# Patient Record
Sex: FEMALE | Race: WHITE | NOT HISPANIC OR LATINO
[De-identification: names, ages, dates, MRNs, and addresses within clinical notes are randomized per-mention and may not be internally consistent; named-entity substitution may affect disease eponyms.]

---

## 2018-01-19 ENCOUNTER — TRANSCRIPTION ENCOUNTER (OUTPATIENT)
Age: 38
End: 2018-01-19

## 2018-09-28 ENCOUNTER — TRANSCRIPTION ENCOUNTER (OUTPATIENT)
Age: 38
End: 2018-09-28

## 2019-01-29 ENCOUNTER — OUTPATIENT (OUTPATIENT)
Dept: OUTPATIENT SERVICES | Facility: HOSPITAL | Age: 39
LOS: 1 days | End: 2019-01-29
Payer: COMMERCIAL

## 2019-01-29 VITALS
RESPIRATION RATE: 16 BRPM | HEART RATE: 94 BPM | OXYGEN SATURATION: 98 % | WEIGHT: 201.06 LBS | SYSTOLIC BLOOD PRESSURE: 135 MMHG | TEMPERATURE: 99 F | DIASTOLIC BLOOD PRESSURE: 93 MMHG

## 2019-01-29 DIAGNOSIS — N84.0 POLYP OF CORPUS UTERI: ICD-10-CM

## 2019-01-29 DIAGNOSIS — Z90.89 ACQUIRED ABSENCE OF OTHER ORGANS: Chronic | ICD-10-CM

## 2019-01-29 DIAGNOSIS — Z01.818 ENCOUNTER FOR OTHER PREPROCEDURAL EXAMINATION: ICD-10-CM

## 2019-01-29 LAB
ALBUMIN SERPL ELPH-MCNC: 3.1 G/DL — LOW (ref 3.3–5)
ALP SERPL-CCNC: 49 U/L — SIGNIFICANT CHANGE UP (ref 40–120)
ALT FLD-CCNC: 16 U/L — SIGNIFICANT CHANGE UP (ref 12–78)
ANION GAP SERPL CALC-SCNC: 7 MMOL/L — SIGNIFICANT CHANGE UP (ref 5–17)
AST SERPL-CCNC: 17 U/L — SIGNIFICANT CHANGE UP (ref 15–37)
BILIRUB SERPL-MCNC: 0.4 MG/DL — SIGNIFICANT CHANGE UP (ref 0.2–1.2)
BUN SERPL-MCNC: 10 MG/DL — SIGNIFICANT CHANGE UP (ref 7–23)
CALCIUM SERPL-MCNC: 8.7 MG/DL — SIGNIFICANT CHANGE UP (ref 8.5–10.1)
CHLORIDE SERPL-SCNC: 108 MMOL/L — SIGNIFICANT CHANGE UP (ref 96–108)
CO2 SERPL-SCNC: 26 MMOL/L — SIGNIFICANT CHANGE UP (ref 22–31)
CREAT SERPL-MCNC: 0.51 MG/DL — SIGNIFICANT CHANGE UP (ref 0.5–1.3)
GLUCOSE SERPL-MCNC: 87 MG/DL — SIGNIFICANT CHANGE UP (ref 70–99)
HCG SERPL-ACNC: <1 MIU/ML — SIGNIFICANT CHANGE UP
HCT VFR BLD CALC: 38.3 % — SIGNIFICANT CHANGE UP (ref 34.5–45)
HGB BLD-MCNC: 12.2 G/DL — SIGNIFICANT CHANGE UP (ref 11.5–15.5)
MCHC RBC-ENTMCNC: 28 PG — SIGNIFICANT CHANGE UP (ref 27–34)
MCHC RBC-ENTMCNC: 31.9 GM/DL — LOW (ref 32–36)
MCV RBC AUTO: 88 FL — SIGNIFICANT CHANGE UP (ref 80–100)
NRBC # BLD: 0 /100 WBCS — SIGNIFICANT CHANGE UP (ref 0–0)
PLATELET # BLD AUTO: 336 K/UL — SIGNIFICANT CHANGE UP (ref 150–400)
POTASSIUM SERPL-MCNC: 4.3 MMOL/L — SIGNIFICANT CHANGE UP (ref 3.5–5.3)
POTASSIUM SERPL-SCNC: 4.3 MMOL/L — SIGNIFICANT CHANGE UP (ref 3.5–5.3)
PROT SERPL-MCNC: 7 G/DL — SIGNIFICANT CHANGE UP (ref 6–8.3)
RBC # BLD: 4.35 M/UL — SIGNIFICANT CHANGE UP (ref 3.8–5.2)
RBC # FLD: 13.9 % — SIGNIFICANT CHANGE UP (ref 10.3–14.5)
SODIUM SERPL-SCNC: 141 MMOL/L — SIGNIFICANT CHANGE UP (ref 135–145)
WBC # BLD: 6.26 K/UL — SIGNIFICANT CHANGE UP (ref 3.8–10.5)
WBC # FLD AUTO: 6.26 K/UL — SIGNIFICANT CHANGE UP (ref 3.8–10.5)

## 2019-01-29 PROCEDURE — 80053 COMPREHEN METABOLIC PANEL: CPT

## 2019-01-29 PROCEDURE — 36415 COLL VENOUS BLD VENIPUNCTURE: CPT

## 2019-01-29 PROCEDURE — 85027 COMPLETE CBC AUTOMATED: CPT

## 2019-01-29 PROCEDURE — 86901 BLOOD TYPING SEROLOGIC RH(D): CPT

## 2019-01-29 PROCEDURE — 86850 RBC ANTIBODY SCREEN: CPT

## 2019-01-29 PROCEDURE — G0463: CPT

## 2019-01-29 PROCEDURE — 86900 BLOOD TYPING SEROLOGIC ABO: CPT

## 2019-01-29 PROCEDURE — 84702 CHORIONIC GONADOTROPIN TEST: CPT

## 2019-01-29 RX ORDER — NORGESTIMATE AND ETHINYL ESTRADIOL 7DAYSX3 LO
0 KIT ORAL
Qty: 0 | Refills: 0 | COMMUNITY

## 2019-01-29 RX ORDER — APREMILAST 10-20-30MG
0 KIT ORAL
Qty: 180 | Refills: 0 | COMMUNITY

## 2019-01-29 RX ORDER — CLOTRIMAZOLE AND BETAMETHASONE DIPROPIONATE 10; .5 MG/G; MG/G
0 CREAM TOPICAL
Qty: 90 | Refills: 0 | COMMUNITY

## 2019-01-29 RX ORDER — TERIFLUNOMIDE 7 MG/1
0 TABLET, FILM COATED ORAL
Qty: 28 | Refills: 0 | COMMUNITY

## 2019-01-29 RX ORDER — NORGESTIMATE AND ETHINYL ESTRADIOL 7DAYSX3 LO
0 KIT ORAL
Qty: 28 | Refills: 0 | COMMUNITY

## 2019-01-29 NOTE — H&P PST ADULT - SKIN/BREAST COMMENTS
Psoriasis under breast and groin folds Pt with psoriasis under breast and groin folds using steroid cream as needed

## 2019-01-29 NOTE — H&P PST ADULT - HISTORY OF PRESENT ILLNESS
37yo female with PMH of MS here for PST. Pt complaining of vaginal bleeding in between menses for 3 months at end of 2018. Pt s/p sonogram and was told "there are 2 uterine polyps". Pt denies fibroids, cysts and uterine lining thickening. Pt denies n/v/d, abdominal pain and pelvic pain. Pt electing for dilation and curettage hysteroscopy, polypectomy on 2/8/19.

## 2019-01-29 NOTE — H&P PST ADULT - NSANTHOSAYNRD_GEN_A_CORE
No. GUSTABO screening performed.  STOP BANG Legend: 0-2 = LOW Risk; 3-4 = INTERMEDIATE Risk; 5-8 = HIGH Risk

## 2019-01-29 NOTE — H&P PST ADULT - NEGATIVE PSYCHIATRIC SYMPTOMS
Pt denies/no depression/no anxiety Pt denies but had h/x of depression as a teenager/no depression/no anxiety

## 2019-01-29 NOTE — H&P PST ADULT - PROBLEM SELECTOR PLAN 2
Medical clearance needed as per surgeon. CBC, Comprehensive panel, T&S and HCG ordered. Pre-op instructions given and pt verbalized understanding.

## 2019-01-29 NOTE — H&P PST ADULT - RS GEN PE MLT RESP DETAILS PC
airway patent/clear to auscultation bilaterally/good air movement/normal/respirations non-labored/breath sounds equal

## 2019-01-29 NOTE — H&P PST ADULT - FAMILY HISTORY
Mother  Still living? Yes, Estimated age: Age Unknown  Family history of breast cancer in mother, Age at diagnosis: Age Unknown  MI (myocardial infarction), Age at diagnosis: Age Unknown

## 2019-01-29 NOTE — H&P PST ADULT - PMH
MS (multiple sclerosis)  (Dx in 2017, currently asymptomatic)  Psoriatic arthritis    Seasonal allergies MS (multiple sclerosis)  (Dx in 2017, currently asymptomatic)  Polyp of corpus uteri    Psoriatic arthritis    Seasonal allergies

## 2019-02-10 PROBLEM — J30.2 OTHER SEASONAL ALLERGIC RHINITIS: Chronic | Status: ACTIVE | Noted: 2019-01-29

## 2019-02-10 PROBLEM — L40.50 ARTHROPATHIC PSORIASIS, UNSPECIFIED: Chronic | Status: ACTIVE | Noted: 2019-01-29

## 2019-02-10 PROBLEM — N84.0 POLYP OF CORPUS UTERI: Chronic | Status: ACTIVE | Noted: 2019-01-29

## 2019-02-10 PROBLEM — G35 MULTIPLE SCLEROSIS: Chronic | Status: ACTIVE | Noted: 2019-01-29

## 2019-02-21 ENCOUNTER — TRANSCRIPTION ENCOUNTER (OUTPATIENT)
Age: 39
End: 2019-02-21

## 2019-02-21 RX ORDER — ACETAMINOPHEN 500 MG
650 TABLET ORAL ONCE
Qty: 0 | Refills: 0 | Status: COMPLETED | OUTPATIENT
Start: 2019-02-22 | End: 2019-02-22

## 2019-02-21 RX ORDER — SODIUM CHLORIDE 9 MG/ML
1000 INJECTION, SOLUTION INTRAVENOUS
Qty: 0 | Refills: 0 | Status: DISCONTINUED | OUTPATIENT
Start: 2019-02-22 | End: 2019-02-22

## 2019-02-22 ENCOUNTER — OUTPATIENT (OUTPATIENT)
Dept: OUTPATIENT SERVICES | Facility: HOSPITAL | Age: 39
LOS: 1 days | End: 2019-02-22
Payer: COMMERCIAL

## 2019-02-22 ENCOUNTER — RESULT REVIEW (OUTPATIENT)
Age: 39
End: 2019-02-22

## 2019-02-22 VITALS
RESPIRATION RATE: 16 BRPM | OXYGEN SATURATION: 97 % | HEIGHT: 61 IN | WEIGHT: 201.06 LBS | TEMPERATURE: 98 F | DIASTOLIC BLOOD PRESSURE: 78 MMHG | SYSTOLIC BLOOD PRESSURE: 137 MMHG | HEART RATE: 97 BPM

## 2019-02-22 VITALS
DIASTOLIC BLOOD PRESSURE: 83 MMHG | SYSTOLIC BLOOD PRESSURE: 129 MMHG | HEART RATE: 104 BPM | OXYGEN SATURATION: 96 % | RESPIRATION RATE: 13 BRPM

## 2019-02-22 DIAGNOSIS — Z90.89 ACQUIRED ABSENCE OF OTHER ORGANS: Chronic | ICD-10-CM

## 2019-02-22 DIAGNOSIS — N84.0 POLYP OF CORPUS UTERI: ICD-10-CM

## 2019-02-22 LAB — HCG UR QL: NEGATIVE — SIGNIFICANT CHANGE UP

## 2019-02-22 PROCEDURE — 81025 URINE PREGNANCY TEST: CPT

## 2019-02-22 PROCEDURE — 88305 TISSUE EXAM BY PATHOLOGIST: CPT | Mod: 26

## 2019-02-22 PROCEDURE — 86850 RBC ANTIBODY SCREEN: CPT

## 2019-02-22 PROCEDURE — 88305 TISSUE EXAM BY PATHOLOGIST: CPT

## 2019-02-22 PROCEDURE — 86901 BLOOD TYPING SEROLOGIC RH(D): CPT

## 2019-02-22 PROCEDURE — 36415 COLL VENOUS BLD VENIPUNCTURE: CPT

## 2019-02-22 PROCEDURE — 58558 HYSTEROSCOPY BIOPSY: CPT

## 2019-02-22 PROCEDURE — 86900 BLOOD TYPING SEROLOGIC ABO: CPT

## 2019-02-22 RX ORDER — ONDANSETRON 8 MG/1
4 TABLET, FILM COATED ORAL ONCE
Qty: 0 | Refills: 0 | Status: COMPLETED | OUTPATIENT
Start: 2019-02-22 | End: 2019-02-22

## 2019-02-22 RX ORDER — OXYCODONE HYDROCHLORIDE 5 MG/1
10 TABLET ORAL ONCE
Qty: 0 | Refills: 0 | Status: DISCONTINUED | OUTPATIENT
Start: 2019-02-22 | End: 2019-02-22

## 2019-02-22 RX ORDER — SODIUM CHLORIDE 9 MG/ML
1000 INJECTION, SOLUTION INTRAVENOUS
Qty: 0 | Refills: 0 | Status: DISCONTINUED | OUTPATIENT
Start: 2019-02-22 | End: 2019-02-22

## 2019-02-22 RX ORDER — HYDROMORPHONE HYDROCHLORIDE 2 MG/ML
0.5 INJECTION INTRAMUSCULAR; INTRAVENOUS; SUBCUTANEOUS
Qty: 0 | Refills: 0 | Status: DISCONTINUED | OUTPATIENT
Start: 2019-02-22 | End: 2019-02-22

## 2019-02-22 RX ORDER — OXYCODONE HYDROCHLORIDE 5 MG/1
5 TABLET ORAL ONCE
Qty: 0 | Refills: 0 | Status: DISCONTINUED | OUTPATIENT
Start: 2019-02-22 | End: 2019-02-22

## 2019-02-22 RX ADMIN — HYDROMORPHONE HYDROCHLORIDE 0.5 MILLIGRAM(S): 2 INJECTION INTRAMUSCULAR; INTRAVENOUS; SUBCUTANEOUS at 08:38

## 2019-02-22 RX ADMIN — HYDROMORPHONE HYDROCHLORIDE 0.5 MILLIGRAM(S): 2 INJECTION INTRAMUSCULAR; INTRAVENOUS; SUBCUTANEOUS at 08:48

## 2019-02-22 RX ADMIN — HYDROMORPHONE HYDROCHLORIDE 0.5 MILLIGRAM(S): 2 INJECTION INTRAMUSCULAR; INTRAVENOUS; SUBCUTANEOUS at 09:20

## 2019-02-22 RX ADMIN — SODIUM CHLORIDE 75 MILLILITER(S): 9 INJECTION, SOLUTION INTRAVENOUS at 08:35

## 2019-02-22 RX ADMIN — SODIUM CHLORIDE 75 MILLILITER(S): 9 INJECTION, SOLUTION INTRAVENOUS at 07:01

## 2019-02-22 RX ADMIN — HYDROMORPHONE HYDROCHLORIDE 0.5 MILLIGRAM(S): 2 INJECTION INTRAMUSCULAR; INTRAVENOUS; SUBCUTANEOUS at 09:30

## 2019-02-22 RX ADMIN — Medication 650 MILLIGRAM(S): at 07:01

## 2019-02-22 RX ADMIN — ONDANSETRON 4 MILLIGRAM(S): 8 TABLET, FILM COATED ORAL at 08:35

## 2019-02-22 NOTE — BRIEF OPERATIVE NOTE - PROCEDURE
<<-----Click on this checkbox to enter Procedure Hysteroscopy with dilation and curettage of uterus  02/22/2019  polypectomy  Active  TSANTIAGOE

## 2019-02-22 NOTE — ASU DISCHARGE PLAN (ADULT/PEDIATRIC). - ACTIVITY LEVEL
no tampons/no intercourse/no douching/no heavy lifting/no tub baths/no sports/gym/nothing per vagina/no exercise

## 2019-02-22 NOTE — ASU PREOP CHECKLIST - LOOSE TEETH
Post Operative Instructions for Port-a-Cath Insertion    Activity  · Plan to rest for the rest of the day following the procedure.   · Do not lift anything heavier than 10 pounds for the rest of the day to avoid straining your incisions. (This includes pushing a vacuum or doing other strenuous housework.)  · Do not drive for a few days after surgery. You may drive as soon as you are able to move comfortably from side to side and have stopped taking any narcotic pain medication.    Self-Care  · Your wound is covered with a gauze dressing. You need to keep the dressing clean, dry, and intact for 24 hours.  · After 24 hours, you may take the dressing off and take a shower.  · If your port has been accessed with a needle, do not get your port wet until the access needle has been removed.   · The port is implanted under your skin and will create a bump on your chest. While the port site is healing, it is going to be sore and tender, so be careful not to bump the port site as it may hurt.    Medications  · It is common to encounter more pain on the first or second day after surgery due to swelling.  · Using pain medication as directed will help to control the pain.  · It is important not to drink alcohol or drive while taking narcotic medications, such as Norco (hydrocodone) or Percocet (oxycodone).  · DO NOT take Tylenol while taking narcotics, such as Norco or Percocet, as these medications contain Tylenol.  · Pain medications can cause constipation. To help prevent constipation, you can try a high fiber diet with lots of fluids, or a stool softener may be taken.    Preventing Blood Clots  · Walking around will help the blood to circulate and help prevent blood clots.  · While setting or lying down, move your feet in a circular motion or pump your ankles up and down 10-30 times an hour.   · If you notice any redness, swelling, or tenderness in your calf muscles, call your doctor.    Call your Doctor if:  · You have a  fever over 100.4 F.  · You notice pus or red streaks coming from the wound.  · You notice any increased redness, swelling, or bleeding coming from the wound.     General Anesthesia Post-Operative Instructions    Due to surgery, you may feel tired or lightheaded and your judgment and motor abilities are going to be impaired for the next 24 hours. For your safety, please follow these instructions:  · DO NOT drive or operate heavy machinery for the next 24 hours.  · DO NOT use alcohol or sleeping pills for the next 24 hours.  · DO NOT make any critical decisions or sign any legal documents for the next 24 hours.  · A responsible adult needs to stay with you for the next 24 hours. Do not stay home alone.  · Limit your activity for the next 24 hours. You may return to your normal activities when your doctor gives you permission.     Anesthesia may cause nausea and vomiting in some individuals. To help prevent this, follow these tips:  · Start with water, clear liquids, and light foods, such as toast, crackers, or jello.  · If you are tolerating light foods without any problems, you may gradually increase diet as desired.  · If nausea or vomiting persists, call your doctor or come into the ER after clinic hours.    To help prevent blood clots, move your feet in a [place] or up and down 10-30 times an hour while sitting or lying down.    Call your doctor if you experience:  · A fever over a 100.4 F.   · If you notice pus or red streaks coming from your wound.  · If you notice increased redness or swelling around the wound.  · If you notice increasing bleeding from your wound.       Medications    You were given:    You may have more:      If you have any problems or questions you may call:    [facility]:  [phone]  [facility]:  [phone]  [facility]:  [phone]  [facility]:  [phone]  [facility]:   [phone]     no

## 2019-02-22 NOTE — ASU PATIENT PROFILE, ADULT - PMH
MS (multiple sclerosis)  (Dx in 2017, currently asymptomatic)  Polyp of corpus uteri    Psoriatic arthritis    Seasonal allergies

## 2019-02-22 NOTE — BRIEF OPERATIVE NOTE - OPERATION/FINDINGS
Normal sized anteverted uterus. Two endometrial polyps on posterior and left lateral wall, completely excised.

## 2019-02-22 NOTE — ASU DISCHARGE PLAN (ADULT/PEDIATRIC). - NOTIFY
Bleeding that does not stop/Persistent Nausea and Vomiting/Unable to Urinate/GYN Fever>100.4 Bleeding that does not stop/Unable to Urinate/GYN Fever>100.4/Persistent Nausea and Vomiting/Pain not relieved by Medications

## 2019-06-10 NOTE — ASU DISCHARGE PLAN (ADULT/PEDIATRIC). - BATHING
06.10.19  Patient presented to [facility] desk today to fill out Authorization form for FMLA. Form filled out and signed by patient. I scanned into \"New Form\" folder  Routing msg to Disability/FMLA   shower only

## 2019-08-16 ENCOUNTER — RESULT REVIEW (OUTPATIENT)
Age: 39
End: 2019-08-16

## 2019-08-16 ENCOUNTER — OUTPATIENT (OUTPATIENT)
Dept: OUTPATIENT SERVICES | Facility: HOSPITAL | Age: 39
LOS: 1 days | End: 2019-08-16
Payer: COMMERCIAL

## 2019-08-16 VITALS
SYSTOLIC BLOOD PRESSURE: 151 MMHG | DIASTOLIC BLOOD PRESSURE: 92 MMHG | OXYGEN SATURATION: 96 % | HEART RATE: 60 BPM | HEIGHT: 61 IN | RESPIRATION RATE: 16 BRPM | TEMPERATURE: 99 F | WEIGHT: 199.08 LBS

## 2019-08-16 DIAGNOSIS — R87.613 HIGH GRADE SQUAMOUS INTRAEPITHELIAL LESION ON CYTOLOGIC SMEAR OF CERVIX (HGSIL): ICD-10-CM

## 2019-08-16 DIAGNOSIS — Z01.818 ENCOUNTER FOR OTHER PREPROCEDURAL EXAMINATION: ICD-10-CM

## 2019-08-16 DIAGNOSIS — N87.1 MODERATE CERVICAL DYSPLASIA: ICD-10-CM

## 2019-08-16 DIAGNOSIS — Z98.890 OTHER SPECIFIED POSTPROCEDURAL STATES: Chronic | ICD-10-CM

## 2019-08-16 DIAGNOSIS — Z90.89 ACQUIRED ABSENCE OF OTHER ORGANS: Chronic | ICD-10-CM

## 2019-08-16 LAB
HCG SERPL-ACNC: <1 MIU/ML — SIGNIFICANT CHANGE UP
HCT VFR BLD CALC: 37.5 % — SIGNIFICANT CHANGE UP (ref 34.5–45)
HGB BLD-MCNC: 12 G/DL — SIGNIFICANT CHANGE UP (ref 11.5–15.5)
MCHC RBC-ENTMCNC: 28.4 PG — SIGNIFICANT CHANGE UP (ref 27–34)
MCHC RBC-ENTMCNC: 32 GM/DL — SIGNIFICANT CHANGE UP (ref 32–36)
MCV RBC AUTO: 88.9 FL — SIGNIFICANT CHANGE UP (ref 80–100)
NRBC # BLD: 0 /100 WBCS — SIGNIFICANT CHANGE UP (ref 0–0)
PLATELET # BLD AUTO: 383 K/UL — SIGNIFICANT CHANGE UP (ref 150–400)
RBC # BLD: 4.22 M/UL — SIGNIFICANT CHANGE UP (ref 3.8–5.2)
RBC # FLD: 13.9 % — SIGNIFICANT CHANGE UP (ref 10.3–14.5)
WBC # BLD: 8.41 K/UL — SIGNIFICANT CHANGE UP (ref 3.8–10.5)
WBC # FLD AUTO: 8.41 K/UL — SIGNIFICANT CHANGE UP (ref 3.8–10.5)

## 2019-08-16 PROCEDURE — 85027 COMPLETE CBC AUTOMATED: CPT

## 2019-08-16 PROCEDURE — 88321 CONSLTJ&REPRT SLD PREP ELSWR: CPT

## 2019-08-16 PROCEDURE — G0463: CPT

## 2019-08-16 PROCEDURE — 36415 COLL VENOUS BLD VENIPUNCTURE: CPT

## 2019-08-16 PROCEDURE — 84702 CHORIONIC GONADOTROPIN TEST: CPT

## 2019-08-16 RX ORDER — NORGESTIMATE AND ETHINYL ESTRADIOL 7DAYSX3 LO
0 KIT ORAL
Qty: 0 | Refills: 0 | DISCHARGE

## 2019-08-16 RX ORDER — APREMILAST 10-20-30MG
0 KIT ORAL
Qty: 0 | Refills: 0 | DISCHARGE

## 2019-08-16 NOTE — H&P PST ADULT - PRESSURE ULCER(S)
Render Post-Care Instructions In Note?: no Consent: The patient's consent was obtained including but not limited to risks of crusting, scabbing, blistering, scarring, darker or lighter pigmentary change, recurrence, incomplete removal and infection. Duration Of Freeze Thaw-Cycle (Seconds): 0 Post-Care Instructions: I reviewed with the patient in detail post-care instructions. Patient is to wear sunprotection, and avoid picking at any of the treated lesions. Pt may apply Vaseline to crusted or scabbing areas. Detail Level: Detailed no

## 2019-08-16 NOTE — H&P PST ADULT - HISTORY OF PRESENT ILLNESS
38 yo obese female with MS and psoriatic arthritis presents to [facility] scheduled for LEEP procedure endocervical curettage on  with Dr. Aparicio. Last PAP was positive with abnormal cells. 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  1. LMP 2019 40 yo obese female with MS and psoriatic arthritis presents to [facility] scheduled for LEEP procedure endocervical curettage on  with Dr. Aparicio. Last PAP was positive with abnormal cells. 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  1. LMP 2019. Denies pain and abnormal bleeding.

## 2019-08-16 NOTE — H&P PST ADULT - NSICDXPROBLEM_GEN_ALL_CORE_FT
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 Aparicio    Problem: Pre-op evaluation  Assessment and Plan: Labs - CBC abd HCG  No MC needed or requested  Pre op instructions reviewed and given. Avoid NSAIDs and OTC supplements. Verbalized understanding

## 2019-08-16 NOTE — H&P PST ADULT - NSICDXPASTSURGICALHX_GEN_ALL_CORE_FT
PAST SURGICAL HISTORY:  S/P D&C (status post dilation and curettage) Uterine polypectomy Feb 2019    S/P tonsillectomy (1988)

## 2019-08-16 NOTE — H&P PST ADULT - ASSESSMENT
38 yo female with HGSIL on a cytologic smear of cervix scheduled for LEEP procedure endocervical curettage on 8/28 with Dr. Aparicio

## 2019-08-16 NOTE — H&P PST ADULT - NSICDXPASTMEDICALHX_GEN_ALL_CORE_FT
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

## 2019-08-16 NOTE — H&P PST ADULT - NSICDXFAMILYHX_GEN_ALL_CORE_FT
FAMILY HISTORY:  Mother  Still living? Yes, Estimated age: Age Unknown  Family history of breast cancer in mother, Age at diagnosis: Age Unknown  MI (myocardial infarction), Age at diagnosis: Age Unknown

## 2019-08-23 LAB — SURGICAL PATHOLOGY STUDY: SIGNIFICANT CHANGE UP

## 2019-08-26 RX ORDER — SODIUM CHLORIDE 9 MG/ML
1000 INJECTION, SOLUTION INTRAVENOUS
Refills: 0 | Status: DISCONTINUED | OUTPATIENT
Start: 2019-08-28 | End: 2019-09-14

## 2019-08-26 RX ORDER — SODIUM CHLORIDE 9 MG/ML
1000 INJECTION, SOLUTION INTRAVENOUS
Refills: 0 | Status: DISCONTINUED | OUTPATIENT
Start: 2019-08-28 | End: 2019-08-28

## 2019-08-27 ENCOUNTER — TRANSCRIPTION ENCOUNTER (OUTPATIENT)
Age: 39
End: 2019-08-27

## 2019-08-28 ENCOUNTER — OUTPATIENT (OUTPATIENT)
Dept: OUTPATIENT SERVICES | Facility: HOSPITAL | Age: 39
LOS: 1 days | End: 2019-08-28
Payer: COMMERCIAL

## 2019-08-28 ENCOUNTER — RESULT REVIEW (OUTPATIENT)
Age: 39
End: 2019-08-28

## 2019-08-28 VITALS
RESPIRATION RATE: 14 BRPM | DIASTOLIC BLOOD PRESSURE: 62 MMHG | TEMPERATURE: 98 F | SYSTOLIC BLOOD PRESSURE: 110 MMHG | HEART RATE: 74 BPM | OXYGEN SATURATION: 98 %

## 2019-08-28 VITALS
TEMPERATURE: 99 F | HEIGHT: 61 IN | RESPIRATION RATE: 16 BRPM | WEIGHT: 199.08 LBS | OXYGEN SATURATION: 98 % | SYSTOLIC BLOOD PRESSURE: 129 MMHG | HEART RATE: 88 BPM | DIASTOLIC BLOOD PRESSURE: 72 MMHG

## 2019-08-28 DIAGNOSIS — N87.1 MODERATE CERVICAL DYSPLASIA: ICD-10-CM

## 2019-08-28 DIAGNOSIS — Z98.890 OTHER SPECIFIED POSTPROCEDURAL STATES: Chronic | ICD-10-CM

## 2019-08-28 DIAGNOSIS — Z90.89 ACQUIRED ABSENCE OF OTHER ORGANS: Chronic | ICD-10-CM

## 2019-08-28 DIAGNOSIS — R87.613 HIGH GRADE SQUAMOUS INTRAEPITHELIAL LESION ON CYTOLOGIC SMEAR OF CERVIX (HGSIL): ICD-10-CM

## 2019-08-28 DIAGNOSIS — Z01.818 ENCOUNTER FOR OTHER PREPROCEDURAL EXAMINATION: ICD-10-CM

## 2019-08-28 LAB — HCG UR QL: NEGATIVE — SIGNIFICANT CHANGE UP

## 2019-08-28 PROCEDURE — 88305 TISSUE EXAM BY PATHOLOGIST: CPT

## 2019-08-28 PROCEDURE — 57505 ENDOCERVICAL CURETTAGE: CPT

## 2019-08-28 PROCEDURE — 88307 TISSUE EXAM BY PATHOLOGIST: CPT

## 2019-08-28 PROCEDURE — 57460 BX OF CERVIX W/SCOPE LEEP: CPT

## 2019-08-28 PROCEDURE — 81025 URINE PREGNANCY TEST: CPT

## 2019-08-28 PROCEDURE — 88305 TISSUE EXAM BY PATHOLOGIST: CPT | Mod: 26

## 2019-08-28 PROCEDURE — 88307 TISSUE EXAM BY PATHOLOGIST: CPT | Mod: 26

## 2019-08-28 RX ORDER — APREMILAST 10-20-30MG
0 KIT ORAL
Qty: 0 | Refills: 0 | DISCHARGE

## 2019-08-28 RX ORDER — CLOTRIMAZOLE AND BETAMETHASONE DIPROPIONATE 10; .5 MG/G; MG/G
0 CREAM TOPICAL
Qty: 0 | Refills: 0 | DISCHARGE

## 2019-08-28 RX ORDER — ONDANSETRON 8 MG/1
4 TABLET, FILM COATED ORAL ONCE
Refills: 0 | Status: DISCONTINUED | OUTPATIENT
Start: 2019-08-28 | End: 2019-08-28

## 2019-08-28 RX ORDER — ACETAMINOPHEN 500 MG
2 TABLET ORAL
Qty: 0 | Refills: 0 | DISCHARGE

## 2019-08-28 RX ORDER — OXYCODONE HYDROCHLORIDE 5 MG/1
5 TABLET ORAL ONCE
Refills: 0 | Status: DISCONTINUED | OUTPATIENT
Start: 2019-08-28 | End: 2019-08-28

## 2019-08-28 RX ORDER — ACETAMINOPHEN 500 MG
975 TABLET ORAL ONCE
Refills: 0 | Status: COMPLETED | OUTPATIENT
Start: 2019-08-28 | End: 2019-08-28

## 2019-08-28 RX ORDER — HYDROMORPHONE HYDROCHLORIDE 2 MG/ML
0.5 INJECTION INTRAMUSCULAR; INTRAVENOUS; SUBCUTANEOUS
Refills: 0 | Status: DISCONTINUED | OUTPATIENT
Start: 2019-08-28 | End: 2019-08-28

## 2019-08-28 RX ORDER — OXYCODONE HYDROCHLORIDE 5 MG/1
10 TABLET ORAL ONCE
Refills: 0 | Status: DISCONTINUED | OUTPATIENT
Start: 2019-08-28 | End: 2019-08-28

## 2019-08-28 RX ORDER — LORATADINE 10 MG/1
1 TABLET ORAL
Qty: 0 | Refills: 0 | DISCHARGE

## 2019-08-28 RX ORDER — TERIFLUNOMIDE 7 MG/1
0 TABLET, FILM COATED ORAL
Qty: 0 | Refills: 0 | DISCHARGE

## 2019-08-28 RX ADMIN — SODIUM CHLORIDE 75 MILLILITER(S): 9 INJECTION, SOLUTION INTRAVENOUS at 13:31

## 2019-08-28 RX ADMIN — SODIUM CHLORIDE 75 MILLILITER(S): 9 INJECTION, SOLUTION INTRAVENOUS at 16:10

## 2019-08-28 RX ADMIN — Medication 975 MILLIGRAM(S): at 13:30

## 2019-08-28 NOTE — BRIEF OPERATIVE NOTE - NSICDXBRIEFPROCEDURE_GEN_ALL_CORE_FT
PROCEDURES:  Endocervical curettage 28-Aug-2019 15:17:09  Shahnaz Aparicio  LEEP 28-Aug-2019 15:16:55  Shahnaz Aparicio

## 2019-08-28 NOTE — ASU PATIENT PROFILE, ADULT - PSH
S/P D&C (status post dilation and curettage)  Uterine polypectomy Feb 2019  S/P tonsillectomy  (1988)

## 2019-08-28 NOTE — ASU DISCHARGE PLAN (ADULT/PEDIATRIC) - CARE PROVIDER_API CALL
Shahnaz Aparicio)  Gynecology Obstetrics  Gynecology  [address] [place] [address]  Phone: [phone]  Fax: [phone]  Follow Up Time:

## 2019-08-28 NOTE — ASU PATIENT PROFILE, ADULT - PMH
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

## 2019-08-28 NOTE — ASU DISCHARGE PLAN (ADULT/PEDIATRIC) - CALL YOUR DOCTOR IF YOU HAVE ANY OF THE FOLLOWING:
Pain not relieved by Medications/Fever greater than (need to indicate Fahrenheit or Celsius)/Bleeding that does not stop Fever greater than (need to indicate Fahrenheit or Celsius)/Inability to tolerate liquids or foods/Pain not relieved by Medications/Bleeding that does not stop/Wound/Surgical Site with redness, or foul smelling discharge or pus

## 2019-08-28 NOTE — ASU DISCHARGE PLAN (ADULT/PEDIATRIC) - ACTIVITY LEVEL
No intercourse/Nothing per vagina/No tampons/No douching Nothing per vagina/No douching/No tampons/No sports/gym/No intercourse/No heavy lifting/No excercise

## 2020-05-28 NOTE — ASU PATIENT PROFILE, ADULT - HEALTH/HEALTHCARE ANXIETIES, PROFILE
Requested Prescriptions     Pending Prescriptions Disp Refills   • TRUE METRIX BLOOD GLUCOSE TEST In Vitro Strip [facility] Name: TRUE METRIX SELF MONITORING BLOOD GLUCOSE STRIPS   Strip] 200 strip 0     Sig: TEST BLOOD SUGAR TWICE DAILY     LOV: Kimberlee
none

## 2023-08-25 NOTE — H&P PST ADULT - LIVES WITH, PROFILE
Gabapentin Pregnancy And Lactation Text: This medication is Pregnancy Category C and isn't considered safe during pregnancy. It is excreted in breast milk. spouse

## 2024-01-25 NOTE — H&P PST ADULT - SKIN
Triglycerides have improved significantly, but now LDL above goal.  Please increase atorvastatin to 80 mg daily and continue current dose Vascepa.
detailed exam

## 2024-07-03 NOTE — ASU PREOP CHECKLIST - ORDERS/MEDICATION ADMINISTRATION RECORD ON CHART
Patient Education     4 Steps for Eating Healthier  Changing the way you eat can improve your health. It can lower your cholesterol and blood pressure, and help you stay at a healthy weight. Your diet doesn’t have to be bland and boring to be healthy. Just watch your calories and follow these steps:    Step 1. Eat fewer unhealthy fats  Choose more fish and lean meats instead of fatty cuts of meat.  Skip butter and lard, and use less margarine.  Pass on foods that have palm, coconut, or hydrogenated oils.  Eat fewer high-fat dairy foods like cheese, ice cream, and whole milk.  Get a heart-healthy cookbook and try some low-fat recipes.  Step 2. Go light on salt  Keep the saltshaker off the table.  Limit high-salt ingredients, such as soy sauce, bouillon, and garlic salt.  Instead of adding salt when cooking, season your food with herbs and flavorings. Try lemon, garlic, and onion, or salt-free herb seasonings.  Limit convenience foods, such as boxed or canned foods and restaurant food.  Read food labels and choose lower-sodium options.  Step 3. Limit sugar  Pause before you add sugars to pancakes, cereal, coffee, or tea. This includes white and brown table sugar, syrup, honey, and molasses. Cut your usual amount by half.  Use non-sugar sweeteners. Stevia, aspartame, and sucralose can satisfy a sweet tooth without adding calories.  Swap out sugar-filled soda and other drinks. Buy sugar-free or low-calorie beverages. Remember water is always the best choice.  Read labels and choose foods with less added sugar. Keep in mind that dairy foods and foods with fruit will have some natural sugar.  Cut the sugar in recipes by 1/3 to 1/2. Boost the flavor with extracts like almond, vanilla, or orange. Or add spices such as cinnamon or nutmeg.  Step 4. Eat more fiber  Eat fresh fruits and vegetables every day.  Boost your diet with whole grains. Go for oats, whole-grain rice, and bran.  Add beans and lentils to your meals.  Drink  more water to match your fiber increase to help prevent constipation.  Date Last Reviewed: 6/1/2017  © 9021-0115 [employer]. [address] [facility], [place] [address]. All rights reserved. This information is not intended as a substitute for professional medical care. Always follow your healthcare professional's instructions.         Exercise for a Healthier Heart     Exercise with a friend. When activity is fun, you're more likely to stick with it.   You may wonder how you can improve the health of your heart. If you’re thinking about exercise, you’re on the right track. You don’t need to become an athlete. But you do need a certain amount of brisk exercise to help strengthen your heart. If you have been diagnosed with a heart condition, your healthcare provider may advise exercise to help stabilize your condition. To help make exercise a habit, choose safe, fun activities.   Before you start  Check with your healthcare provider before starting an exercise program. This is especially important if you have not been active for a while. It's also important if you have a long-term (chronic) health problem such as heart disease, diabetes, or obesity. Or if you are at high risk for having these problems.   Why exercise?  Exercising regularly offers many healthy rewards. It can help you do all of the following:  Improve your blood cholesterol level to help prevent further heart trouble  Lower your blood pressure to help prevent a stroke or heart attack  Control diabetes, or reduce your risk of getting this disease  Improve your heart and lung function  Reach and stay at a healthy weight  Make your muscles stronger so you can stay active  Prevent falls and fractures by slowing the loss of bone mass (osteoporosis)  Manage stress better  Reduce your blood pressure  Improve your sense of self and your body image  Exercise tips     Ease into your routine. Set small goals. Then build on them. If you are not sure  what your activity level should be, talk with your healthcare provider first before starting an exercise routine.  Exercise on most days. Aim for a total of 150 minutes (2 hours and 30 minutes) or more of moderate-intensity aerobic activity each week. Or 75 minutes (1 hour and 15 minutes) or more of vigorous-intensity aerobic activity each week. Or try for a combination of both. Moderate activity means that you breathe heavier and your heart rate increases but you can still talk. Think about doing 40 minutes of moderate exercise, 3 to 4 times a week. For best results, activity should last for about 40 minutes to lower blood pressure and cholesterol. It's OK to work up to the 40-minute period over time. Examples of moderate-intensity activity are walking 1 mile in 15 minutes. Or doing 30 to 45 minutes of yard work.  Step up your daily activity level. Along with your exercise program, try being more active the whole day. Walk instead of drive. Or park further away so that you take more steps each day. Do more household tasks or yard work. You may not be able to meet the advised mount of physical activity. But doing some moderate- or vigorous-intensity aerobic activity can help reduce your risk for heart disease. Your healthcare provider can help you figure out what is best for you.  Choose 1 or more activities you enjoy. Walking is one of the easiest things you can do. You can also try swimming, riding a bike, dancing, or taking an exercise class.     When to call your healthcare provider  Call your healthcare provider if you have any of these:   Chest pain or feel dizzy or lightheaded  Burning, tightness, pressure, or heaviness in your chest, neck, shoulders, back, or arms  Abnormal shortness of breath  More joint or muscle pain  A very fast or irregular heartbeat (palpitations)  Zita last reviewed this educational content on 7/1/2019  © 5398-8399 [employer] [address] [facility], [place]  [address]. All rights reserved. This information is not intended as a substitute for professional medical care. Always follow your healthcare professional's instructions.            done